# Patient Record
Sex: FEMALE | Race: OTHER | HISPANIC OR LATINO
[De-identification: names, ages, dates, MRNs, and addresses within clinical notes are randomized per-mention and may not be internally consistent; named-entity substitution may affect disease eponyms.]

---

## 2018-01-13 ENCOUNTER — RESULT REVIEW (OUTPATIENT)
Age: 28
End: 2018-01-13

## 2018-08-17 ENCOUNTER — EMERGENCY (EMERGENCY)
Facility: HOSPITAL | Age: 28
LOS: 1 days | Discharge: STILL ADMITTED OR ADMINISTRATIVE | End: 2018-08-17
Admitting: EMERGENCY MEDICINE

## 2018-08-17 ENCOUNTER — INPATIENT (INPATIENT)
Facility: HOSPITAL | Age: 28
LOS: 1 days | Discharge: HOME | End: 2018-08-19
Attending: OBSTETRICS & GYNECOLOGY | Admitting: OBSTETRICS & GYNECOLOGY

## 2018-08-17 ENCOUNTER — TRANSCRIPTION ENCOUNTER (OUTPATIENT)
Age: 28
End: 2018-08-17

## 2018-08-17 VITALS
DIASTOLIC BLOOD PRESSURE: 83 MMHG | SYSTOLIC BLOOD PRESSURE: 108 MMHG | TEMPERATURE: 99 F | RESPIRATION RATE: 16 BRPM | OXYGEN SATURATION: 100 % | HEART RATE: 66 BPM

## 2018-08-17 VITALS — WEIGHT: 145.51 LBS | HEIGHT: 60 IN

## 2018-08-17 DIAGNOSIS — O26.899 OTHER SPECIFIED PREGNANCY RELATED CONDITIONS, UNSPECIFIED TRIMESTER: ICD-10-CM

## 2018-08-17 DIAGNOSIS — Z3A.00 WEEKS OF GESTATION OF PREGNANCY NOT SPECIFIED: ICD-10-CM

## 2018-08-17 LAB
BASOPHILS # BLD AUTO: 0.03 K/UL — SIGNIFICANT CHANGE UP (ref 0–0.2)
BASOPHILS NFR BLD AUTO: 0.4 % — SIGNIFICANT CHANGE UP (ref 0–2)
BLD GP AB SCN SERPL QL: NEGATIVE — SIGNIFICANT CHANGE UP
EOSINOPHIL # BLD AUTO: 0.15 K/UL — SIGNIFICANT CHANGE UP (ref 0–0.5)
EOSINOPHIL NFR BLD AUTO: 2 % — SIGNIFICANT CHANGE UP (ref 0–6)
HCT VFR BLD CALC: 34.4 % — LOW (ref 34.5–45)
HGB BLD-MCNC: 11.2 G/DL — LOW (ref 11.5–15.5)
IMM GRANULOCYTES # BLD AUTO: 0.05 # — SIGNIFICANT CHANGE UP
IMM GRANULOCYTES NFR BLD AUTO: 0.7 % — SIGNIFICANT CHANGE UP (ref 0–1.5)
LYMPHOCYTES # BLD AUTO: 1.83 K/UL — SIGNIFICANT CHANGE UP (ref 1–3.3)
LYMPHOCYTES # BLD AUTO: 24.5 % — SIGNIFICANT CHANGE UP (ref 13–44)
MCHC RBC-ENTMCNC: 28 PG — SIGNIFICANT CHANGE UP (ref 27–34)
MCHC RBC-ENTMCNC: 32.6 % — SIGNIFICANT CHANGE UP (ref 32–36)
MCV RBC AUTO: 86 FL — SIGNIFICANT CHANGE UP (ref 80–100)
MONOCYTES # BLD AUTO: 0.54 K/UL — SIGNIFICANT CHANGE UP (ref 0–0.9)
MONOCYTES NFR BLD AUTO: 7.2 % — SIGNIFICANT CHANGE UP (ref 2–14)
NEUTROPHILS # BLD AUTO: 4.86 K/UL — SIGNIFICANT CHANGE UP (ref 1.8–7.4)
NEUTROPHILS NFR BLD AUTO: 65.2 % — SIGNIFICANT CHANGE UP (ref 43–77)
NRBC # FLD: 0 — SIGNIFICANT CHANGE UP
PLATELET # BLD AUTO: 242 K/UL — SIGNIFICANT CHANGE UP (ref 150–400)
PMV BLD: 10.4 FL — SIGNIFICANT CHANGE UP (ref 7–13)
RBC # BLD: 4 M/UL — SIGNIFICANT CHANGE UP (ref 3.8–5.2)
RBC # FLD: 14 % — SIGNIFICANT CHANGE UP (ref 10.3–14.5)
RH IG SCN BLD-IMP: POSITIVE — SIGNIFICANT CHANGE UP
RH IG SCN BLD-IMP: POSITIVE — SIGNIFICANT CHANGE UP
T PALLIDUM AB TITR SER: NEGATIVE — SIGNIFICANT CHANGE UP
WBC # BLD: 7.46 K/UL — SIGNIFICANT CHANGE UP (ref 3.8–10.5)
WBC # FLD AUTO: 7.46 K/UL — SIGNIFICANT CHANGE UP (ref 3.8–10.5)

## 2018-08-17 RX ORDER — MAGNESIUM HYDROXIDE 400 MG/1
30 TABLET, CHEWABLE ORAL
Qty: 0 | Refills: 0 | Status: DISCONTINUED | OUTPATIENT
Start: 2018-08-17 | End: 2018-08-19

## 2018-08-17 RX ORDER — OXYCODONE HYDROCHLORIDE 5 MG/1
5 TABLET ORAL
Qty: 0 | Refills: 0 | Status: DISCONTINUED | OUTPATIENT
Start: 2018-08-17 | End: 2018-08-19

## 2018-08-17 RX ORDER — PRAMOXINE HYDROCHLORIDE 150 MG/15G
1 AEROSOL, FOAM RECTAL EVERY 4 HOURS
Qty: 0 | Refills: 0 | Status: DISCONTINUED | OUTPATIENT
Start: 2018-08-17 | End: 2018-08-17

## 2018-08-17 RX ORDER — SODIUM CHLORIDE 9 MG/ML
3 INJECTION INTRAMUSCULAR; INTRAVENOUS; SUBCUTANEOUS EVERY 8 HOURS
Qty: 0 | Refills: 0 | Status: DISCONTINUED | OUTPATIENT
Start: 2018-08-17 | End: 2018-08-17

## 2018-08-17 RX ORDER — IBUPROFEN 200 MG
600 TABLET ORAL EVERY 6 HOURS
Qty: 0 | Refills: 0 | Status: DISCONTINUED | OUTPATIENT
Start: 2018-08-17 | End: 2018-08-19

## 2018-08-17 RX ORDER — SODIUM CHLORIDE 9 MG/ML
3 INJECTION INTRAMUSCULAR; INTRAVENOUS; SUBCUTANEOUS EVERY 8 HOURS
Qty: 0 | Refills: 0 | Status: DISCONTINUED | OUTPATIENT
Start: 2018-08-17 | End: 2018-08-19

## 2018-08-17 RX ORDER — OXYTOCIN 10 UNIT/ML
41.67 VIAL (ML) INJECTION
Qty: 20 | Refills: 0 | Status: DISCONTINUED | OUTPATIENT
Start: 2018-08-17 | End: 2018-08-17

## 2018-08-17 RX ORDER — GLYCERIN ADULT
1 SUPPOSITORY, RECTAL RECTAL AT BEDTIME
Qty: 0 | Refills: 0 | Status: DISCONTINUED | OUTPATIENT
Start: 2018-08-17 | End: 2018-08-19

## 2018-08-17 RX ORDER — HYDROCORTISONE 1 %
1 OINTMENT (GRAM) TOPICAL EVERY 4 HOURS
Qty: 0 | Refills: 0 | Status: DISCONTINUED | OUTPATIENT
Start: 2018-08-17 | End: 2018-08-18

## 2018-08-17 RX ORDER — OXYTOCIN 10 UNIT/ML
333.33 VIAL (ML) INJECTION
Qty: 20 | Refills: 0 | Status: COMPLETED | OUTPATIENT
Start: 2018-08-17

## 2018-08-17 RX ORDER — KETOROLAC TROMETHAMINE 30 MG/ML
30 SYRINGE (ML) INJECTION ONCE
Qty: 0 | Refills: 0 | Status: DISCONTINUED | OUTPATIENT
Start: 2018-08-17 | End: 2018-08-17

## 2018-08-17 RX ORDER — PRAMOXINE HYDROCHLORIDE 150 MG/15G
1 AEROSOL, FOAM RECTAL EVERY 4 HOURS
Qty: 0 | Refills: 0 | Status: DISCONTINUED | OUTPATIENT
Start: 2018-08-17 | End: 2018-08-18

## 2018-08-17 RX ORDER — SODIUM CHLORIDE 9 MG/ML
1000 INJECTION, SOLUTION INTRAVENOUS
Qty: 0 | Refills: 0 | Status: DISCONTINUED | OUTPATIENT
Start: 2018-08-17 | End: 2018-08-17

## 2018-08-17 RX ORDER — DOCUSATE SODIUM 100 MG
100 CAPSULE ORAL
Qty: 0 | Refills: 0 | Status: DISCONTINUED | OUTPATIENT
Start: 2018-08-17 | End: 2018-08-19

## 2018-08-17 RX ORDER — OXYTOCIN 10 UNIT/ML
333.33 VIAL (ML) INJECTION
Qty: 20 | Refills: 0 | Status: DISCONTINUED | OUTPATIENT
Start: 2018-08-17 | End: 2018-08-18

## 2018-08-17 RX ORDER — ACETAMINOPHEN 500 MG
975 TABLET ORAL EVERY 6 HOURS
Qty: 0 | Refills: 0 | Status: DISCONTINUED | OUTPATIENT
Start: 2018-08-17 | End: 2018-08-19

## 2018-08-17 RX ORDER — HYDROCORTISONE 1 %
1 OINTMENT (GRAM) TOPICAL EVERY 4 HOURS
Qty: 0 | Refills: 0 | Status: DISCONTINUED | OUTPATIENT
Start: 2018-08-17 | End: 2018-08-17

## 2018-08-17 RX ORDER — OXYCODONE HYDROCHLORIDE 5 MG/1
5 TABLET ORAL EVERY 4 HOURS
Qty: 0 | Refills: 0 | Status: DISCONTINUED | OUTPATIENT
Start: 2018-08-17 | End: 2018-08-19

## 2018-08-17 RX ORDER — DIBUCAINE 1 %
1 OINTMENT (GRAM) RECTAL EVERY 4 HOURS
Qty: 0 | Refills: 0 | Status: DISCONTINUED | OUTPATIENT
Start: 2018-08-17 | End: 2018-08-17

## 2018-08-17 RX ORDER — DIPHENHYDRAMINE HCL 50 MG
25 CAPSULE ORAL EVERY 6 HOURS
Qty: 0 | Refills: 0 | Status: DISCONTINUED | OUTPATIENT
Start: 2018-08-17 | End: 2018-08-19

## 2018-08-17 RX ORDER — SODIUM CHLORIDE 9 MG/ML
1000 INJECTION, SOLUTION INTRAVENOUS ONCE
Qty: 0 | Refills: 0 | Status: COMPLETED | OUTPATIENT
Start: 2018-08-17 | End: 2018-08-17

## 2018-08-17 RX ORDER — AER TRAVELER 0.5 G/1
1 SOLUTION RECTAL; TOPICAL EVERY 4 HOURS
Qty: 0 | Refills: 0 | Status: DISCONTINUED | OUTPATIENT
Start: 2018-08-17 | End: 2018-08-17

## 2018-08-17 RX ORDER — OXYTOCIN 10 UNIT/ML
2 VIAL (ML) INJECTION
Qty: 30 | Refills: 0 | Status: DISCONTINUED | OUTPATIENT
Start: 2018-08-17 | End: 2018-08-18

## 2018-08-17 RX ORDER — AER TRAVELER 0.5 G/1
1 SOLUTION RECTAL; TOPICAL EVERY 4 HOURS
Qty: 0 | Refills: 0 | Status: DISCONTINUED | OUTPATIENT
Start: 2018-08-17 | End: 2018-08-19

## 2018-08-17 RX ORDER — LANOLIN
1 OINTMENT (GRAM) TOPICAL EVERY 6 HOURS
Qty: 0 | Refills: 0 | Status: DISCONTINUED | OUTPATIENT
Start: 2018-08-17 | End: 2018-08-19

## 2018-08-17 RX ORDER — ACETAMINOPHEN 500 MG
975 TABLET ORAL EVERY 6 HOURS
Qty: 0 | Refills: 0 | Status: COMPLETED | OUTPATIENT
Start: 2018-08-17 | End: 2019-07-16

## 2018-08-17 RX ORDER — SIMETHICONE 80 MG/1
80 TABLET, CHEWABLE ORAL EVERY 6 HOURS
Qty: 0 | Refills: 0 | Status: DISCONTINUED | OUTPATIENT
Start: 2018-08-17 | End: 2018-08-19

## 2018-08-17 RX ORDER — IBUPROFEN 200 MG
600 TABLET ORAL EVERY 6 HOURS
Qty: 0 | Refills: 0 | Status: COMPLETED | OUTPATIENT
Start: 2018-08-17 | End: 2019-07-16

## 2018-08-17 RX ORDER — DIBUCAINE 1 %
1 OINTMENT (GRAM) RECTAL EVERY 4 HOURS
Qty: 0 | Refills: 0 | Status: DISCONTINUED | OUTPATIENT
Start: 2018-08-17 | End: 2018-08-19

## 2018-08-17 RX ORDER — TETANUS TOXOID, REDUCED DIPHTHERIA TOXOID AND ACELLULAR PERTUSSIS VACCINE, ADSORBED 5; 2.5; 8; 8; 2.5 [IU]/.5ML; [IU]/.5ML; UG/.5ML; UG/.5ML; UG/.5ML
0.5 SUSPENSION INTRAMUSCULAR ONCE
Qty: 0 | Refills: 0 | Status: COMPLETED | OUTPATIENT
Start: 2018-08-17

## 2018-08-17 RX ADMIN — Medication 2 MILLIUNIT(S)/MIN: at 10:42

## 2018-08-17 RX ADMIN — Medication 30 MILLIGRAM(S): at 16:40

## 2018-08-17 RX ADMIN — SODIUM CHLORIDE 250 MILLILITER(S): 9 INJECTION, SOLUTION INTRAVENOUS at 10:29

## 2018-08-17 RX ADMIN — Medication 975 MILLIGRAM(S): at 23:48

## 2018-08-17 RX ADMIN — SODIUM CHLORIDE 3 MILLILITER(S): 9 INJECTION INTRAMUSCULAR; INTRAVENOUS; SUBCUTANEOUS at 22:10

## 2018-08-17 RX ADMIN — Medication 975 MILLIGRAM(S): at 20:15

## 2018-08-17 RX ADMIN — Medication 1000 MILLIUNIT(S)/MIN: at 16:30

## 2018-08-17 RX ADMIN — Medication 30 MILLIGRAM(S): at 16:45

## 2018-08-17 RX ADMIN — SODIUM CHLORIDE 2000 MILLILITER(S): 9 INJECTION, SOLUTION INTRAVENOUS at 12:45

## 2018-08-17 RX ADMIN — Medication 125 MILLIUNIT(S)/MIN: at 16:36

## 2018-08-17 RX ADMIN — Medication 600 MILLIGRAM(S): at 23:48

## 2018-08-17 RX ADMIN — Medication 975 MILLIGRAM(S): at 19:45

## 2018-08-17 NOTE — DISCHARGE NOTE OB - MATERIALS PROVIDED
Tdap Vaccination (VIS Pub Date: 2012)/Breastfeeding Log/Shaken Baby Prevention Handout/Breastfeeding Guide and Packet/Birth Certificate Instructions/[facility] [place] Screening Program/[place]  Immunization Record/Breastfeeding Mother’s Support Group Information/Guide to Postpartum Care/[facility] Hearing Screen Program/Back To Sleep Handout/Vaccinations

## 2018-08-17 NOTE — DISCHARGE NOTE OB - CARE PROVIDER_API CALL
Nancy Sierra), Obstetrics and Gynecology  [address]  [address]  [place] [address]  Phone: [phone]  Fax: [phone]

## 2018-08-17 NOTE — DISCHARGE NOTE OB - PATIENT PORTAL LINK FT
You can access the [email or web address] Patient Portal, offered by [facility], by registering with the following website: [email or web address]

## 2018-08-17 NOTE — DISCHARGE NOTE OB - HOSPITAL COURSE
29 yo female s/p  on  for viable male infant.  Post partum course uncomplicated.  Patient is meeting all milestones for discharge.  Stable for discharge.  Follow up as scheduled in the office.

## 2018-08-17 NOTE — ED ADULT TRIAGE NOTE - CHIEF COMPLAINT QUOTE
pt arrives w/ c/o water breaking 1 hr ago. Pt states 40 weeks pregnant and states . pt just bloody discharge. Spoke with Jackie in L&D pt okayed to come to L&D. MD Kitchen called for eval pt cleared to be transported to L&D with ed tech,

## 2018-08-18 RX ORDER — HYDROCORTISONE 1 %
1 OINTMENT (GRAM) TOPICAL EVERY 4 HOURS
Qty: 0 | Refills: 0 | Status: DISCONTINUED | OUTPATIENT
Start: 2018-08-18 | End: 2018-08-19

## 2018-08-18 RX ORDER — TETANUS TOXOID, REDUCED DIPHTHERIA TOXOID AND ACELLULAR PERTUSSIS VACCINE, ADSORBED 5; 2.5; 8; 8; 2.5 [IU]/.5ML; [IU]/.5ML; UG/.5ML; UG/.5ML; UG/.5ML
0.5 SUSPENSION INTRAMUSCULAR ONCE
Qty: 0 | Refills: 0 | Status: COMPLETED | OUTPATIENT
Start: 2018-08-18 | End: 2018-08-18

## 2018-08-18 RX ORDER — PRAMOXINE HYDROCHLORIDE 150 MG/15G
1 AEROSOL, FOAM RECTAL EVERY 4 HOURS
Qty: 0 | Refills: 0 | Status: DISCONTINUED | OUTPATIENT
Start: 2018-08-18 | End: 2018-08-19

## 2018-08-18 RX ADMIN — SODIUM CHLORIDE 3 MILLILITER(S): 9 INJECTION INTRAMUSCULAR; INTRAVENOUS; SUBCUTANEOUS at 13:46

## 2018-08-18 RX ADMIN — Medication 600 MILLIGRAM(S): at 13:55

## 2018-08-18 RX ADMIN — Medication 600 MILLIGRAM(S): at 17:47

## 2018-08-18 RX ADMIN — Medication 600 MILLIGRAM(S): at 05:41

## 2018-08-18 RX ADMIN — Medication 1 TABLET(S): at 12:56

## 2018-08-18 RX ADMIN — Medication 975 MILLIGRAM(S): at 06:17

## 2018-08-18 RX ADMIN — Medication 975 MILLIGRAM(S): at 17:47

## 2018-08-18 RX ADMIN — OXYCODONE HYDROCHLORIDE 5 MILLIGRAM(S): 5 TABLET ORAL at 09:40

## 2018-08-18 RX ADMIN — Medication 600 MILLIGRAM(S): at 12:56

## 2018-08-18 RX ADMIN — Medication 600 MILLIGRAM(S): at 06:17

## 2018-08-18 RX ADMIN — Medication 600 MILLIGRAM(S): at 18:45

## 2018-08-18 RX ADMIN — Medication 975 MILLIGRAM(S): at 13:55

## 2018-08-18 RX ADMIN — Medication 975 MILLIGRAM(S): at 18:45

## 2018-08-18 RX ADMIN — Medication 975 MILLIGRAM(S): at 05:41

## 2018-08-18 RX ADMIN — Medication 975 MILLIGRAM(S): at 00:36

## 2018-08-18 RX ADMIN — OXYCODONE HYDROCHLORIDE 5 MILLIGRAM(S): 5 TABLET ORAL at 10:40

## 2018-08-18 RX ADMIN — TETANUS TOXOID, REDUCED DIPHTHERIA TOXOID AND ACELLULAR PERTUSSIS VACCINE, ADSORBED 0.5 MILLILITER(S): 5; 2.5; 8; 8; 2.5 SUSPENSION INTRAMUSCULAR at 18:42

## 2018-08-18 RX ADMIN — Medication 975 MILLIGRAM(S): at 12:56

## 2018-08-18 RX ADMIN — Medication 600 MILLIGRAM(S): at 00:36

## 2018-08-18 RX ADMIN — SODIUM CHLORIDE 3 MILLILITER(S): 9 INJECTION INTRAMUSCULAR; INTRAVENOUS; SUBCUTANEOUS at 05:41

## 2018-08-19 VITALS
TEMPERATURE: 98 F | HEART RATE: 58 BPM | OXYGEN SATURATION: 99 % | SYSTOLIC BLOOD PRESSURE: 107 MMHG | RESPIRATION RATE: 17 BRPM | DIASTOLIC BLOOD PRESSURE: 67 MMHG

## 2018-08-19 RX ORDER — IBUPROFEN 200 MG
1 TABLET ORAL
Qty: 0 | Refills: 0 | COMMUNITY
Start: 2018-08-19

## 2018-08-19 RX ORDER — ACETAMINOPHEN 500 MG
3 TABLET ORAL
Qty: 0 | Refills: 0 | COMMUNITY
Start: 2018-08-19

## 2018-08-19 RX ADMIN — Medication 975 MILLIGRAM(S): at 06:25

## 2018-08-19 RX ADMIN — Medication 600 MILLIGRAM(S): at 00:40

## 2018-08-19 RX ADMIN — Medication 600 MILLIGRAM(S): at 07:00

## 2018-08-19 RX ADMIN — Medication 975 MILLIGRAM(S): at 07:00

## 2018-08-19 RX ADMIN — Medication 600 MILLIGRAM(S): at 00:16

## 2018-08-19 RX ADMIN — Medication 975 MILLIGRAM(S): at 00:40

## 2018-08-19 RX ADMIN — Medication 600 MILLIGRAM(S): at 06:25

## 2018-08-19 RX ADMIN — Medication 975 MILLIGRAM(S): at 00:16

## 2018-08-19 NOTE — PROGRESS NOTE ADULT - SUBJECTIVE AND OBJECTIVE BOX
S: Patient doing well. Minimal lochia. Pain controlled.     O: Vital Signs Last 24 Hrs  T(C): 36.2 (18 Aug 2018 17:33), Max: 36.5 (18 Aug 2018 06:34)  T(F): 97.2 (18 Aug 2018 17:33), Max: 97.7 (18 Aug 2018 06:34)  HR: 76 (18 Aug 2018 17:33) (58 - 76)  BP: 101/62 (18 Aug 2018 17:33) (95/56 - 101/62)  BP(mean): --  RR: 18 (18 Aug 2018 17:33) (18 - 18)  SpO2: 99% (18 Aug 2018 17:33) (95% - 99%)    Gen: NAD  Abd: soft, NT, ND, fundus firm below umbilicus  Lochia: moderate  Ext: no tenderness    Labs:                        11.2   7.46  )-----------( 242      ( 17 Aug 2018 06:45 )             34.4       ABO Interpretation: O ( @ 06:53)    Rh Interpretation: Positive ( @ 06:53)    Antibody Screen Negative      A: 28y PPD#2 s/p  doing well.     Plan: Continue routine postpartum care. Anticipate d/c home.  Marycarmen LUCIO

## 2022-06-06 ENCOUNTER — EMERGENCY (EMERGENCY)
Facility: HOSPITAL | Age: 32
LOS: 1 days | Discharge: HOME | End: 2022-06-06
Attending: EMERGENCY MEDICINE | Admitting: EMERGENCY MEDICINE
Payer: MEDICAID

## 2022-06-06 VITALS
OXYGEN SATURATION: 100 % | HEIGHT: 60 IN | SYSTOLIC BLOOD PRESSURE: 115 MMHG | HEART RATE: 86 BPM | DIASTOLIC BLOOD PRESSURE: 72 MMHG | RESPIRATION RATE: 18 BRPM | TEMPERATURE: 97 F

## 2022-06-06 PROCEDURE — 99284 EMERGENCY DEPT VISIT MOD MDM: CPT

## 2022-06-07 VITALS
TEMPERATURE: 98 F | HEART RATE: 72 BPM | DIASTOLIC BLOOD PRESSURE: 75 MMHG | RESPIRATION RATE: 18 BRPM | OXYGEN SATURATION: 100 % | SYSTOLIC BLOOD PRESSURE: 109 MMHG

## 2022-06-07 PROCEDURE — 70360 X-RAY EXAM OF NECK: CPT | Mod: 26

## 2022-06-07 PROCEDURE — 70490 CT SOFT TISSUE NECK W/O DYE: CPT | Mod: 26,MA

## 2022-06-07 RX ORDER — KETOROLAC TROMETHAMINE 30 MG/ML
30 SYRINGE (ML) INJECTION ONCE
Refills: 0 | Status: DISCONTINUED | OUTPATIENT
Start: 2022-06-07 | End: 2022-06-07

## 2022-06-07 RX ORDER — DEXAMETHASONE 0.5 MG/5ML
6 ELIXIR ORAL ONCE
Refills: 0 | Status: COMPLETED | OUTPATIENT
Start: 2022-06-07 | End: 2022-06-07

## 2022-06-07 RX ADMIN — Medication 30 MILLIGRAM(S): at 02:24

## 2022-06-07 RX ADMIN — Medication 6 MILLIGRAM(S): at 05:19

## 2022-06-07 NOTE — ED PROVIDER NOTE - PROGRESS NOTE DETAILS
Aurelio Loyola, PGY3: CT without foreign body noted. Will PO challenge and reassess. Patient has multiple incidental findings that can be followed up outpatient. If able to PO, can d/c w/ ENT follow up. Patient aware of plan, discussed using  Valerie #[id number]. Aurelio Loyola, PGY3: No acute obstruction on x-ray. Will obtain CT to further eval. Aurelio Loyola, PGY3: Patient tolerating PO. Will give ENT follow up. Discussed return precautions and all questions answered. Pt in agreement w/ plan. CAOx3, NAD, VSS. Stable for d/c.

## 2022-06-07 NOTE — ED PROVIDER NOTE - NSFOLLOWUPCLINICS_GEN_ALL_ED_FT
[facility] - ENT  Otolaryngology (ENT)  [address] [place] [address]  Phone: [phone]  Fax:   Follow Up Time: Routine

## 2022-06-07 NOTE — ED ADULT NURSE NOTE - CHIEF COMPLAINT QUOTE
Patient arrives with fish bone stuck in throat 1900. Endorsing discomfort to throat. NAD noted, speaking in full sentences, respirations even and unlabored. No PMHx.

## 2022-06-07 NOTE — ED ADULT NURSE REASSESSMENT NOTE - NS ED NURSE REASSESS COMMENT FT1
Pt resting comfortably in stretcher. Respirations even and unlabored O2 sat 100%. MD Loyola at bedside, will continue to monitor.
Break RN: Pt is aox4, c/o throat pain at this time. Taken to XR AND CT, pending results. Medications given as ordered. No facial drooling noted, breathing non-labored. Will continue to monitor

## 2022-06-07 NOTE — ED PROVIDER NOTE - CLINICAL SUMMARY MEDICAL DECISION MAKING FREE TEXT BOX
Aurelio Loyoal, PGY3: 32 year old female p/w foreign body sensation in throat after eating fish, likely fishbone foreign body in throat. Tolerating secretions, airway patent. Will obtain XR neck, consider CT if unable to locate, ENT consult, reassess for dispo.

## 2022-06-07 NOTE — ED PROVIDER NOTE - OBJECTIVE STATEMENT
32 year old female no PMH presents to ED for foreign body. Patient states she was eating fish she prepared around 7pm when she developed R sided neck pain and foreign body sensation. She felt pain while swallowing and speaking and later developed vomiting. She notes multiple episodes of vomiting and now c/o frontal headache. She denies fever, chills, chest pain, or sob. 32 year old female no PMH presents to ED for foreign body. Patient states she was eating fish she prepared around 7pm when she developed R sided neck pain and foreign body sensation. She felt pain while swallowing and speaking and later developed vomiting. She notes multiple episodes of vomiting and now c/o frontal headache. She denies fever, chills, chest pain, or sob.     Roxana #[id number]

## 2022-06-07 NOTE — ED ADULT NURSE NOTE - OBJECTIVE STATEMENT
Pt received in rm #12, 32Y F Aox4, ambulatory primary Lebanese speaking. Pt c/o possible foreign body in throat. Reports ate fish around 7PM last night, Now endorsing painful swallowing, and right sided neck pain. Denies sob, difficulty swallowing, drooling. Pt respirations even and unlabored, Taken to XR at this time, will continue to monitor upon return

## 2022-06-07 NOTE — ED PROVIDER NOTE - PATIENT PORTAL LINK FT
You can access the FollowMyHealth Patient Portal offered by [facility] by registering at the following website: [email or web address]. By joining [employer]’s FollowMyHealth portal, you will also be able to view your health information using other applications (apps) compatible with our system.

## 2022-06-07 NOTE — ED PROVIDER NOTE - PHYSICAL EXAMINATION
GENERAL: A&Ox3, non-toxic appearing, no acute distress  HEENT: NCAT, EOMI, oral mucosa moist, normal conjunctiva, no lymphadenopathy, no pharyngeal erythema, no tonsillar exudates, no visible foreign body, tolerating secretions, no drooling, no hoarseness   RESP: CTAB, no respiratory distress, no wheezes/rhonchi/rales, speaking in full sentences  CV: RRR, no murmurs/rubs/gallops  ABDOMEN: soft, non-tender, non-distended, no guarding  MSK: no visible deformities  NEURO: no focal sensory or motor deficits, CN 2-12 grossly intact  SKIN: warm, normal color, well perfused, no rash  PSYCH: normal affect

## 2022-06-07 NOTE — ED ADULT NURSE NOTE - NSIMPLEMENTINTERV_GEN_ALL_ED
Implemented All Universal Safety Interventions:  [place] to call system. Call bell, personal items and telephone within reach. Instruct patient to call for assistance. Room bathroom lighting operational. Non-slip footwear when patient is off stretcher. Physically safe environment: no spills, clutter or unnecessary equipment. Stretcher in lowest position, wheels locked, appropriate side rails in place.

## 2022-06-07 NOTE — ED PROVIDER NOTE - ATTENDING CONTRIBUTION TO CARE
Afebrile. Awake and Alert. Lungs CTA. Heart RRR. CN II-XII grossly intact. Moves all extremities without lateralization. Mouth: Oropharynx clear, uvula midline, no tonsilar hypertrophy, exudate or erythema, no anterior cervical lymphadenopathy. No drooling. No hoarseness.    r/o FB in throat

## 2022-06-07 NOTE — ED PROVIDER NOTE - NSFOLLOWUPINSTRUCTIONS_ED_ALL_ED_FT
You were seen in the emergency department for possible foreign body in your throat. You had an x-ray and CT scan which did not locate any blockage. If you continue to have symptoms, please follow up with the ENT doctors as above.     Advance your diet slowly as tolerated. This may include only liquid diet or soft foods until symptoms improve.     Rest, drink plenty of fluids.  Advance activity as tolerated.  Continue all previously prescribed medications as directed.  Follow up with your primary care physician in 48-72 hours- bring copies of your results.  Return to the ER for worsening or persistent symptoms, and/or ANY NEW OR CONCERNING SYMPTOMS. If you have issues obtaining follow up, please call: [phone] to obtain a doctor or specialist who takes your insurance in your area.    ______________________________________________________________________________________  Fue atendido en urgencias por posible cuerpo extraño en la garganta. Le hicieron kate radiografía y kate tomografía computarizada que no localizaron ningún bloqueo. Si continúa teniendo síntomas, lulu un seguimiento con los médicos otorrinolaringólogos zaheer se indicó anteriormente.    Avance en salas dieta lentamente según lo tolere. [place] puede incluir solo kate dieta líquida o alimentos blandos hasta que los síntomas mejoren.    Descansa, anastasia muchos líquidos. Avance en la actividad según lo tolere. Continúe con todos los medicamentos recetados previamente según las indicaciones. Lulu un seguimiento con salas médico de atención primaria en 48 a 72 horas; traiga copias de joo resultados. Regrese a la fredo de emergencias si los síntomas empeoran o persisten, y/o CUALQUIER SÍNTOMA NUEVO O PREOCUPANTE. Si tiene problemas para obtener un seguimiento, rich ramirez: [phone] para obtener un médico o especialista que acepte salas seguro en salas área.

## 2023-01-09 ENCOUNTER — EMERGENCY (EMERGENCY)
Facility: HOSPITAL | Age: 33
LOS: 1 days | Discharge: HOME | End: 2023-01-09
Admitting: EMERGENCY MEDICINE
Payer: MEDICAID

## 2023-01-09 VITALS
TEMPERATURE: 98 F | RESPIRATION RATE: 18 BRPM | HEART RATE: 65 BPM | DIASTOLIC BLOOD PRESSURE: 61 MMHG | SYSTOLIC BLOOD PRESSURE: 103 MMHG | OXYGEN SATURATION: 100 %

## 2023-01-09 PROCEDURE — 99284 EMERGENCY DEPT VISIT MOD MDM: CPT

## 2023-01-09 RX ORDER — SODIUM CHLORIDE 9 MG/ML
1000 INJECTION INTRAMUSCULAR; INTRAVENOUS; SUBCUTANEOUS ONCE
Refills: 0 | Status: COMPLETED | OUTPATIENT
Start: 2023-01-09 | End: 2023-01-09

## 2023-01-09 RX ORDER — ONDANSETRON 8 MG/1
4 TABLET, FILM COATED ORAL ONCE
Refills: 0 | Status: COMPLETED | OUTPATIENT
Start: 2023-01-09 | End: 2023-01-09

## 2023-01-09 RX ORDER — KETOROLAC TROMETHAMINE 30 MG/ML
15 SYRINGE (ML) INJECTION ONCE
Refills: 0 | Status: DISCONTINUED | OUTPATIENT
Start: 2023-01-09 | End: 2023-01-09

## 2023-01-10 VITALS
HEART RATE: 72 BPM | SYSTOLIC BLOOD PRESSURE: 92 MMHG | RESPIRATION RATE: 18 BRPM | TEMPERATURE: 98 F | DIASTOLIC BLOOD PRESSURE: 59 MMHG | OXYGEN SATURATION: 100 %

## 2023-01-10 PROBLEM — Z78.9 OTHER SPECIFIED HEALTH STATUS: Chronic | Status: ACTIVE | Noted: 2022-06-07

## 2023-01-10 LAB
ALBUMIN SERPL ELPH-MCNC: 3.8 G/DL — SIGNIFICANT CHANGE UP (ref 3.3–5)
ALP SERPL-CCNC: 85 U/L — SIGNIFICANT CHANGE UP (ref 40–120)
ALT FLD-CCNC: 18 U/L — SIGNIFICANT CHANGE UP (ref 4–33)
ANION GAP SERPL CALC-SCNC: 10 MMOL/L — SIGNIFICANT CHANGE UP (ref 7–14)
APPEARANCE UR: CLEAR — SIGNIFICANT CHANGE UP
AST SERPL-CCNC: 18 U/L — SIGNIFICANT CHANGE UP (ref 4–32)
BACTERIA # UR AUTO: NEGATIVE — SIGNIFICANT CHANGE UP
BASOPHILS # BLD AUTO: 0.03 K/UL — SIGNIFICANT CHANGE UP (ref 0–0.2)
BASOPHILS NFR BLD AUTO: 0.4 % — SIGNIFICANT CHANGE UP (ref 0–2)
BILIRUB SERPL-MCNC: <0.2 MG/DL — SIGNIFICANT CHANGE UP (ref 0.2–1.2)
BILIRUB UR-MCNC: NEGATIVE — SIGNIFICANT CHANGE UP
BUN SERPL-MCNC: 13 MG/DL — SIGNIFICANT CHANGE UP (ref 7–23)
CALCIUM SERPL-MCNC: 9.3 MG/DL — SIGNIFICANT CHANGE UP (ref 8.4–10.5)
CHLORIDE SERPL-SCNC: 103 MMOL/L — SIGNIFICANT CHANGE UP (ref 98–107)
CO2 SERPL-SCNC: 23 MMOL/L — SIGNIFICANT CHANGE UP (ref 22–31)
COLOR SPEC: COLORLESS — SIGNIFICANT CHANGE UP
CREAT SERPL-MCNC: 0.6 MG/DL — SIGNIFICANT CHANGE UP (ref 0.5–1.3)
CULTURE RESULTS: SIGNIFICANT CHANGE UP
DIFF PNL FLD: ABNORMAL
EGFR: 124 ML/MIN/1.73M2 — SIGNIFICANT CHANGE UP
EOSINOPHIL # BLD AUTO: 0.21 K/UL — SIGNIFICANT CHANGE UP (ref 0–0.5)
EOSINOPHIL NFR BLD AUTO: 2.7 % — SIGNIFICANT CHANGE UP (ref 0–6)
EPI CELLS # UR: 1 /HPF — SIGNIFICANT CHANGE UP (ref 0–5)
FLUAV AG NPH QL: SIGNIFICANT CHANGE UP
FLUBV AG NPH QL: SIGNIFICANT CHANGE UP
GLUCOSE SERPL-MCNC: 79 MG/DL — SIGNIFICANT CHANGE UP (ref 70–99)
GLUCOSE UR QL: NEGATIVE — SIGNIFICANT CHANGE UP
HCT VFR BLD CALC: 34 % — LOW (ref 34.5–45)
HGB BLD-MCNC: 10.9 G/DL — LOW (ref 11.5–15.5)
IANC: 4.07 K/UL — SIGNIFICANT CHANGE UP (ref 1.8–7.4)
IMM GRANULOCYTES NFR BLD AUTO: 0.3 % — SIGNIFICANT CHANGE UP (ref 0–0.9)
KETONES UR-MCNC: NEGATIVE — SIGNIFICANT CHANGE UP
LEUKOCYTE ESTERASE UR-ACNC: NEGATIVE — SIGNIFICANT CHANGE UP
LYMPHOCYTES # BLD AUTO: 2.76 K/UL — SIGNIFICANT CHANGE UP (ref 1–3.3)
LYMPHOCYTES # BLD AUTO: 35.8 % — SIGNIFICANT CHANGE UP (ref 13–44)
MCHC RBC-ENTMCNC: 27.6 PG — SIGNIFICANT CHANGE UP (ref 27–34)
MCHC RBC-ENTMCNC: 32.1 GM/DL — SIGNIFICANT CHANGE UP (ref 32–36)
MCV RBC AUTO: 86.1 FL — SIGNIFICANT CHANGE UP (ref 80–100)
MONOCYTES # BLD AUTO: 0.63 K/UL — SIGNIFICANT CHANGE UP (ref 0–0.9)
MONOCYTES NFR BLD AUTO: 8.2 % — SIGNIFICANT CHANGE UP (ref 2–14)
NEUTROPHILS # BLD AUTO: 4.07 K/UL — SIGNIFICANT CHANGE UP (ref 1.8–7.4)
NEUTROPHILS NFR BLD AUTO: 52.6 % — SIGNIFICANT CHANGE UP (ref 43–77)
NITRITE UR-MCNC: NEGATIVE — SIGNIFICANT CHANGE UP
NRBC # BLD: 0 /100 WBCS — SIGNIFICANT CHANGE UP (ref 0–0)
NRBC # FLD: 0 K/UL — SIGNIFICANT CHANGE UP (ref 0–0)
PH UR: 7 — SIGNIFICANT CHANGE UP (ref 5–8)
PLATELET # BLD AUTO: 428 K/UL — HIGH (ref 150–400)
POTASSIUM SERPL-MCNC: 3.9 MMOL/L — SIGNIFICANT CHANGE UP (ref 3.5–5.3)
POTASSIUM SERPL-SCNC: 3.9 MMOL/L — SIGNIFICANT CHANGE UP (ref 3.5–5.3)
PROT SERPL-MCNC: 7.6 G/DL — SIGNIFICANT CHANGE UP (ref 6–8.3)
PROT UR-MCNC: ABNORMAL
RBC # BLD: 3.95 M/UL — SIGNIFICANT CHANGE UP (ref 3.8–5.2)
RBC # FLD: 12.7 % — SIGNIFICANT CHANGE UP (ref 10.3–14.5)
RBC CASTS # UR COMP ASSIST: 17 /HPF — HIGH (ref 0–4)
RSV RNA NPH QL NAA+NON-PROBE: SIGNIFICANT CHANGE UP
SARS-COV-2 RNA SPEC QL NAA+PROBE: SIGNIFICANT CHANGE UP
SODIUM SERPL-SCNC: 136 MMOL/L — SIGNIFICANT CHANGE UP (ref 135–145)
SP GR SPEC: 1.02 — SIGNIFICANT CHANGE UP (ref 1.01–1.05)
SPECIMEN SOURCE: SIGNIFICANT CHANGE UP
UROBILINOGEN FLD QL: SIGNIFICANT CHANGE UP
WBC # BLD: 7.72 K/UL — SIGNIFICANT CHANGE UP (ref 3.8–10.5)
WBC # FLD AUTO: 7.72 K/UL — SIGNIFICANT CHANGE UP (ref 3.8–10.5)
WBC UR QL: 0 /HPF — SIGNIFICANT CHANGE UP (ref 0–5)

## 2023-01-10 PROCEDURE — 76770 US EXAM ABDO BACK WALL COMP: CPT | Mod: 26

## 2023-01-10 RX ADMIN — SODIUM CHLORIDE 1000 MILLILITER(S): 9 INJECTION INTRAMUSCULAR; INTRAVENOUS; SUBCUTANEOUS at 00:19

## 2023-01-10 RX ADMIN — Medication 15 MILLIGRAM(S): at 00:19

## 2023-01-10 RX ADMIN — ONDANSETRON 4 MILLIGRAM(S): 8 TABLET, FILM COATED ORAL at 00:19

## 2023-01-10 NOTE — ED PROVIDER NOTE - OBJECTIVE STATEMENT
32-year-old female no reported past medical history presents with fevers chills trouble urinating headache dizziness x2 weeks. c/o b/l flank pain, urinary frequency, burning, and flu like symptoms. Patient denies any chest pain or shortness of breath.  No sick contacts.  Patient reporting mild back pain.  No abdominal pain nausea vomiting diarrhea.

## 2023-01-10 NOTE — ED PROVIDER NOTE - NSFOLLOWUPINSTRUCTIONS_ED_ALL_ED_FT
Follow up with your primary care physician in 48-72 hours- bring copies of your results.  Return to the ER for worsening or persistent symptoms, including but not limited to worsening/persistent pain, shortness of breath, fevers, vomiting, lightheadedness, passing out and/or ANY NEW OR CONCERNING SYMPTOMS. If you have issues obtaining follow up, please call: [phone] to obtain a doctor or specialist who takes your insurance in your area.  You may call [phone] to make an appointment with the internal medicine clinic.

## 2023-01-10 NOTE — ED PROVIDER NOTE - CLINICAL SUMMARY MEDICAL DECISION MAKING FREE TEXT BOX
32-year-old female no reported past medical history presents with fevers chills trouble urinating headache dizziness x2 weeks. c/o b/l flank pain, urinary frequency, burning, and flu like symptoms. Patient denies any chest pain or shortness of breath.  No sick contacts.  Patient reporting mild back pain.  No abdominal pain nausea vomiting diarrhea.  plan  - labs  - ua/cx  - KUB US   - ivf  - symptomatic treatment

## 2023-01-10 NOTE — ED ADULT NURSE NOTE - OBJECTIVE STATEMENT
Patient A&Ox4 primarily Upper sorbian speaking ambulatory c/o b/l flank pain, urinary frequency, burning, and flu like symptoms x2 weeks. Also endorsing intermittent nausea. Patient denies any pmhx. Respirations even and unlabored. Abdomen soft, round, slightly tender on touch. UCG negative and documented. 20G IV placed to left antecubital, labs collected and sent to lab. Medicated per orders. Stretcher in lowest position, wheels locked, appropriate side rails in place, call bell in reach.

## 2023-01-10 NOTE — ED PROVIDER NOTE - PATIENT PORTAL LINK FT
You can access the FollowMyHealth Patient Portal offered by [facility] by registering at the following website: [email or web address]. By joining [employer]’s FollowMyHealth portal, you will also be able to view your health information using other applications (apps) compatible with our system.

## 2023-08-07 ENCOUNTER — EMERGENCY (EMERGENCY)
Facility: HOSPITAL | Age: 33
LOS: 1 days | Discharge: HOME | End: 2023-08-07
Admitting: EMERGENCY MEDICINE
Payer: MEDICAID

## 2023-08-07 VITALS
TEMPERATURE: 98 F | RESPIRATION RATE: 16 BRPM | OXYGEN SATURATION: 100 % | HEART RATE: 99 BPM | DIASTOLIC BLOOD PRESSURE: 72 MMHG | SYSTOLIC BLOOD PRESSURE: 115 MMHG

## 2023-08-07 LAB
ANION GAP SERPL CALC-SCNC: 11 MMOL/L — SIGNIFICANT CHANGE UP (ref 7–14)
APPEARANCE UR: CLEAR — SIGNIFICANT CHANGE UP
B PERT DNA SPEC QL NAA+PROBE: SIGNIFICANT CHANGE UP
B PERT+PARAPERT DNA PNL SPEC NAA+PROBE: SIGNIFICANT CHANGE UP
BACTERIA # UR AUTO: NEGATIVE /HPF — SIGNIFICANT CHANGE UP
BASOPHILS # BLD AUTO: 0.03 K/UL — SIGNIFICANT CHANGE UP (ref 0–0.2)
BASOPHILS NFR BLD AUTO: 0.3 % — SIGNIFICANT CHANGE UP (ref 0–2)
BILIRUB UR-MCNC: NEGATIVE — SIGNIFICANT CHANGE UP
BORDETELLA PARAPERTUSSIS (RAPRVP): SIGNIFICANT CHANGE UP
BUN SERPL-MCNC: 13 MG/DL — SIGNIFICANT CHANGE UP (ref 7–23)
C PNEUM DNA SPEC QL NAA+PROBE: SIGNIFICANT CHANGE UP
CALCIUM SERPL-MCNC: 9 MG/DL — SIGNIFICANT CHANGE UP (ref 8.4–10.5)
CAST: 0 /LPF — SIGNIFICANT CHANGE UP (ref 0–4)
CHLORIDE SERPL-SCNC: 99 MMOL/L — SIGNIFICANT CHANGE UP (ref 98–107)
CO2 SERPL-SCNC: 23 MMOL/L — SIGNIFICANT CHANGE UP (ref 22–31)
COLOR SPEC: YELLOW — SIGNIFICANT CHANGE UP
CREAT SERPL-MCNC: 0.7 MG/DL — SIGNIFICANT CHANGE UP (ref 0.5–1.3)
DIFF PNL FLD: ABNORMAL
EGFR: 119 ML/MIN/1.73M2 — SIGNIFICANT CHANGE UP
EOSINOPHIL # BLD AUTO: 0.36 K/UL — SIGNIFICANT CHANGE UP (ref 0–0.5)
EOSINOPHIL NFR BLD AUTO: 3.7 % — SIGNIFICANT CHANGE UP (ref 0–6)
FLUAV SUBTYP SPEC NAA+PROBE: SIGNIFICANT CHANGE UP
FLUBV RNA SPEC QL NAA+PROBE: SIGNIFICANT CHANGE UP
GLUCOSE SERPL-MCNC: 90 MG/DL — SIGNIFICANT CHANGE UP (ref 70–99)
GLUCOSE UR QL: NEGATIVE MG/DL — SIGNIFICANT CHANGE UP
HADV DNA SPEC QL NAA+PROBE: SIGNIFICANT CHANGE UP
HCOV 229E RNA SPEC QL NAA+PROBE: SIGNIFICANT CHANGE UP
HCOV HKU1 RNA SPEC QL NAA+PROBE: SIGNIFICANT CHANGE UP
HCOV NL63 RNA SPEC QL NAA+PROBE: SIGNIFICANT CHANGE UP
HCOV OC43 RNA SPEC QL NAA+PROBE: SIGNIFICANT CHANGE UP
HCT VFR BLD CALC: 35.8 % — SIGNIFICANT CHANGE UP (ref 34.5–45)
HGB BLD-MCNC: 11.9 G/DL — SIGNIFICANT CHANGE UP (ref 11.5–15.5)
HMPV RNA SPEC QL NAA+PROBE: SIGNIFICANT CHANGE UP
HPIV1 RNA SPEC QL NAA+PROBE: SIGNIFICANT CHANGE UP
HPIV2 RNA SPEC QL NAA+PROBE: SIGNIFICANT CHANGE UP
HPIV3 RNA SPEC QL NAA+PROBE: SIGNIFICANT CHANGE UP
HPIV4 RNA SPEC QL NAA+PROBE: SIGNIFICANT CHANGE UP
IANC: 6.66 K/UL — SIGNIFICANT CHANGE UP (ref 1.8–7.4)
IMM GRANULOCYTES NFR BLD AUTO: 0.6 % — SIGNIFICANT CHANGE UP (ref 0–0.9)
KETONES UR-MCNC: NEGATIVE MG/DL — SIGNIFICANT CHANGE UP
LEUKOCYTE ESTERASE UR-ACNC: NEGATIVE — SIGNIFICANT CHANGE UP
LYMPHOCYTES # BLD AUTO: 1.65 K/UL — SIGNIFICANT CHANGE UP (ref 1–3.3)
LYMPHOCYTES # BLD AUTO: 17 % — SIGNIFICANT CHANGE UP (ref 13–44)
M PNEUMO DNA SPEC QL NAA+PROBE: SIGNIFICANT CHANGE UP
MCHC RBC-ENTMCNC: 28.3 PG — SIGNIFICANT CHANGE UP (ref 27–34)
MCHC RBC-ENTMCNC: 33.2 GM/DL — SIGNIFICANT CHANGE UP (ref 32–36)
MCV RBC AUTO: 85 FL — SIGNIFICANT CHANGE UP (ref 80–100)
MONOCYTES # BLD AUTO: 0.93 K/UL — HIGH (ref 0–0.9)
MONOCYTES NFR BLD AUTO: 9.6 % — SIGNIFICANT CHANGE UP (ref 2–14)
NEUTROPHILS # BLD AUTO: 6.66 K/UL — SIGNIFICANT CHANGE UP (ref 1.8–7.4)
NEUTROPHILS NFR BLD AUTO: 68.8 % — SIGNIFICANT CHANGE UP (ref 43–77)
NITRITE UR-MCNC: NEGATIVE — SIGNIFICANT CHANGE UP
NRBC # BLD: 0 /100 WBCS — SIGNIFICANT CHANGE UP (ref 0–0)
NRBC # FLD: 0 K/UL — SIGNIFICANT CHANGE UP (ref 0–0)
PH UR: 6.5 — SIGNIFICANT CHANGE UP (ref 5–8)
PLATELET # BLD AUTO: 412 K/UL — HIGH (ref 150–400)
POTASSIUM SERPL-MCNC: 4 MMOL/L — SIGNIFICANT CHANGE UP (ref 3.5–5.3)
POTASSIUM SERPL-SCNC: 4 MMOL/L — SIGNIFICANT CHANGE UP (ref 3.5–5.3)
PROT UR-MCNC: NEGATIVE MG/DL — SIGNIFICANT CHANGE UP
RAPID RVP RESULT: DETECTED
RBC # BLD: 4.21 M/UL — SIGNIFICANT CHANGE UP (ref 3.8–5.2)
RBC # FLD: 12.9 % — SIGNIFICANT CHANGE UP (ref 10.3–14.5)
RBC CASTS # UR COMP ASSIST: 26 /HPF — HIGH (ref 0–4)
RSV RNA SPEC QL NAA+PROBE: SIGNIFICANT CHANGE UP
RV+EV RNA SPEC QL NAA+PROBE: DETECTED
SARS-COV-2 RNA SPEC QL NAA+PROBE: SIGNIFICANT CHANGE UP
SODIUM SERPL-SCNC: 133 MMOL/L — LOW (ref 135–145)
SP GR SPEC: 1.02 — SIGNIFICANT CHANGE UP (ref 1–1.03)
SQUAMOUS # UR AUTO: 1 /HPF — SIGNIFICANT CHANGE UP (ref 0–5)
UROBILINOGEN FLD QL: 0.2 MG/DL — SIGNIFICANT CHANGE UP (ref 0.2–1)
WBC # BLD: 9.69 K/UL — SIGNIFICANT CHANGE UP (ref 3.8–10.5)
WBC # FLD AUTO: 9.69 K/UL — SIGNIFICANT CHANGE UP (ref 3.8–10.5)
WBC UR QL: 4 /HPF — SIGNIFICANT CHANGE UP (ref 0–5)

## 2023-08-07 PROCEDURE — 71046 X-RAY EXAM CHEST 2 VIEWS: CPT | Mod: 26

## 2023-08-07 PROCEDURE — 99284 EMERGENCY DEPT VISIT MOD MDM: CPT

## 2023-08-07 PROCEDURE — 93010 ELECTROCARDIOGRAM REPORT: CPT

## 2023-08-07 RX ORDER — KETOROLAC TROMETHAMINE 30 MG/ML
30 SYRINGE (ML) INJECTION ONCE
Refills: 0 | Status: DISCONTINUED | OUTPATIENT
Start: 2023-08-07 | End: 2023-08-07

## 2023-08-07 RX ORDER — SODIUM CHLORIDE 9 MG/ML
1000 INJECTION INTRAMUSCULAR; INTRAVENOUS; SUBCUTANEOUS ONCE
Refills: 0 | Status: COMPLETED | OUTPATIENT
Start: 2023-08-07 | End: 2023-08-07

## 2023-08-07 RX ORDER — IBUPROFEN 200 MG
1 TABLET ORAL
Qty: 20 | Refills: 0
Start: 2023-08-07 | End: 2023-08-11

## 2023-08-07 RX ADMIN — Medication 30 MILLIGRAM(S): at 22:19

## 2023-08-07 RX ADMIN — SODIUM CHLORIDE 1000 MILLILITER(S): 9 INJECTION INTRAMUSCULAR; INTRAVENOUS; SUBCUTANEOUS at 22:19

## 2023-08-07 NOTE — ED PROVIDER NOTE - OBJECTIVE STATEMENT
32 y/o female presents to ER for flu like symptoms. pt. primarily Estonian speaking -  ID [id number] - stets for the past 8-10 days has been experiencinf intermittent fevers chills bodyaches and dry cough as well as headache and generalized weakness. pt. also staetsfor the past week has been experiencing burning wth urination and urinary frequency as well. states has been taking tylenol with minimal relief. Denies cp sob nausea vomti diarrhea.   Denies recent travel or sick contacts.

## 2023-08-07 NOTE — ED ADULT TRIAGE NOTE - CHIEF COMPLAINT QUOTE
Pt c/o cough x 10 days. Endorses weakness, fever, nausea, headaches, body aches, chest pain. Denies diarrhea, SOB.  No Past Medical History.

## 2023-08-07 NOTE — ED PROVIDER NOTE - NSFOLLOWUPINSTRUCTIONS_ED_ALL_ED_FT
Viral Syndrome    WHAT YOU NEED TO KNOW:    Viral syndrome is a term used for symptoms of an infection caused by a virus. Viruses are spread easily from person to person through the air and on shared items.    DISCHARGE INSTRUCTIONS:    Call your local emergency number (911 in the [place]) or have someone else call if:    You have a seizure.    You cannot be woken.    You have chest pain or trouble breathing.  Seek care immediately if:    You have a stiff neck, a bad headache, and sensitivity to light.    You feel weak, dizzy, or confused.    You stop urinating or urinate a lot less than usual.    You cough up blood or thick yellow or green mucus.    You have severe abdominal pain or your abdomen is larger than usual.  Call your doctor if:    Your symptoms do not get better with treatment or get worse after 3 days.    You have a rash or ear pain.    You have burning when you urinate.    You have questions or concerns about your condition or care.  Medicines: You may need any of the following:    Acetaminophen decreases pain and fever. It is available without a doctor's order. Ask how much to take and how often to take it. Follow directions. Read the labels of all other medicines you are using to see if they also contain acetaminophen, or ask your doctor or pharmacist. Acetaminophen can cause liver damage if not taken correctly. Do not use more than 4 grams (4,000 milligrams) total of acetaminophen in one day.    NSAIDs, such as ibuprofen, help decrease swelling, pain, and fever. NSAIDs can cause stomach bleeding or kidney problems in certain people. If you take blood thinner medicine, always ask your healthcare provider if NSAIDs are safe for you. Always read the medicine label and follow directions.    Cold medicine helps decrease swelling, control a cough, and relieve chest or nasal congestion.    Saline nasal spray helps decrease nasal congestion.    Take your medicine as directed. Contact your healthcare provider if you think your medicine is not helping or if you have side effects. Tell him of her if you are allergic to any medicine. Keep a list of the medicines, vitamins, and herbs you take. Include the amounts, and when and why you take them. Bring the list or the pill bottles to follow-up visits. Carry your medicine list with you in case of an emergency.  Manage your symptoms:    Drink liquids as directed to prevent dehydration. Ask how much liquid to drink each day and which liquids are best for you. Ask if you should drink an oral rehydration solution (ORS). An ORS has the right amounts of water, salts, and sugar you need to replace body fluids. This may help prevent dehydration caused by vomiting or diarrhea. Do not drink liquids with caffeine. Liquids with caffeine can make dehydration worse.    Get plenty of rest to help your body heal. Take naps throughout the day. Ask your healthcare provider when you can return to work and your normal activities.    Use a cool mist humidifier to help you breathe easier. Ask your healthcare provider how to use a cool mist humidifier.    Eat honey or use cough drops for a sore throat. Cough drops are available without a doctor's order. Follow directions for taking cough drops.    Do not smoke or be close to anyone who is smoking. Nicotine and other chemicals in cigarettes and cigars can cause lung damage. Smoking can also delay healing. Ask your healthcare provider for information if you currently smoke and need help to quit. E-cigarettes or smokeless tobacco still contain nicotine. Talk to your healthcare provider before you use these products.  Prevent the spread of germs:      Wash your hands often. Wash your hands several times each day. Wash after you use the bathroom, change a child's diaper, and before you prepare or eat food. Use soap and water every time. Rub your soapy hands together, lacing your fingers. Wash the front and back of your hands, and in between your fingers. Use the fingers of one hand to scrub under the fingernails of the other hand. Wash for at least 20 seconds. Rinse with warm, running water for several seconds. Then dry your hands with a clean towel or paper towel. Use hand  that contains alcohol if soap and water are not available. Do not touch your eyes, nose, or mouth without washing your hands first.  Handwashing      Cover a sneeze or cough. Use a tissue that covers your mouth and nose. Throw the tissue away in a trash can right away. Use the bend of your arm if a tissue is not available. Wash your hands well with soap and water or use a hand .    Stay away from others while you are sick. Avoid crowds as much as possible.    Ask about vaccines you may need. Talk to your healthcare provider about your vaccine history. He or she will tell you which vaccines you need, and when to get them.  Get the influenza (flu) vaccine as soon as recommended each year. The flu vaccine is available starting in September or October. Flu viruses change, so it is important to get a flu vaccine every year.    Get the pneumonia vaccine if recommended. This vaccine is usually recommended every 5 years. Your provider will tell you when to get this vaccine, if needed.  Follow up with your doctor as directed: Write down your questions so you remember to ask them during your visits.    Síndrome viral    LO QUE NECESITA SABER:    El síndrome viral es un término usado para los síntomas de kate infección causada por un virus. Los virus son propagados fácilmente de kate persona a otra a través del aire y mediante los objetos que se comparten.    INSTRUCCIONES SOBRE EL DASHAWN HOSPITALARIA:    Llame al número local de emergencias (911 en los [place]), o pídale a alguien que llame si:    Usted sufre kate convulsión.    No es posible despertarlo.    Usted tiene dolor torácico y dificultad para respirar.  Busque atención médica de inmediato si:    Usted tiene rigidez en el aniket, dolor de madhavi intenso y sensibilidad a la enrique.    Usted se siente débil, mareado o confundido.    Usted matthew de orinar u orina menos de lo habitual.    Usted tose shannan o kate mucosidad espesa amarilla o arely.    Usted tiene dolor abdominal severo o salas abdomen está más yazan de lo habitual.  Llame a salas médico si:    Myrtle síntomas no mejoran con el tratamiento o empeoran después de 3 días.    Usted tiene salpullido o dolor de oído.    Usted siente ardor al orinar.    Usted tiene preguntas o inquietudes acerca de salas condición o cuidado.  Medicamentos:Es posible que usted necesite alguno de los siguientes:    Acetaminofénalivia el dolor y baja la fiebre. Está disponible sin receta médica. Pregunte la cantidad y la frecuencia con que debe tomarlos. Siga las indicaciones. Ovidio las etiquetas de todos los demás medicamentos que esté usando para saber si también contienen acetaminofén, o pregunte a salas médico o farmacéutico. El acetaminofén puede causar daño en el hígado cuando no se jason de forma correcta. No use más de 4 gramos (4000 miligramos) en total de acetaminofeno en un día.    Los RENZO,zaheer el ibuprofeno, ayudan a disminuir la inflamación, el dolor y la fiebre. Los RENZO pueden causar sangrado estomacal o problemas renales en ciertas personas. Si usted jason un medicamento anticoagulante, siempre pregúntele a salas médico si los RENZO son seguros para usted. Siempre ovidio la etiqueta de torin medicamento y siga las instrucciones.    El medicamento para el resfriadoayuda a disminuir la inflamación, a controlar la tos y a aliviar la congestión del pecho o nasal.    El rociador nasal de agua salinaayuda a disminuir la congestión nasal.    Elbert myrtle medicamentos zaheer se le haya indicado.Consulte con salas médico si usted leslie que salas medicamento no le está ayudando o si presenta efectos secundarios. Infórmele si es alérgico a algún medicamento. Mantenga kate lista actualizada de los medicamentos, las vitaminas y los productos herbales que jason. Incluya los siguientes datos de los medicamentos: cantidad, frecuencia y motivo de administración. Traiga con usted la lista o los envases de las píldoras a myrtle citas de seguimiento. Lleve la lista de los medicamentos con usted en jennifer de kate emergencia.  El manejo de myrtle síntomas:    Elbert líquidos zaheer se le indique para evitar kate deshidratación.Pregunte cuánto líquido debe wali cada día y cuáles líquidos son los más adecuados para usted. Pregunte si usted debería wali kate solución de rehidratación oral (SRO). Kate SRO tiene las cantidades exactas de agua, sal y azúcar que usted necesita para reemplazar los líquidos corporales. [place] podría ayudarlo a evitar la deshidratación a causa del vómito y de la diarrea. No tome líquidos con cafeína. Los líquidos con cafeína pueden empeorar la deshidratación.    Descanse lo suficiente para ayudar a que salas cuerpo sane.Elbert siestas rosalba el día. Pregunte a salas médico cuándo puede regresar a salas trabajo y a myrtle actividades cotidianas.    Use un humidificador de mary jo fría para respirar más fácilmente.Pregunte a salas médico cómo usar un humidificador de vapor frío.    Coma miel o use pastillas para la tos para el dolor de garganta.Los caramelos para la tos están disponibles sin receta médica. Siga las indicaciones para wali los caramelos para la tos.    No fume ni esté cerca a alguien que esté fumando.La nicotina y otras sustancias químicas que contienen los cigarrillos y cigarros pueden dañar los pulmones. El fumar también puede retrasar la sanación. Pida información a salas médico si usted actualmente fuma y necesita ayuda para dejar de fumar. Los cigarrillos electrónicos o el tabaco sin humo igualmente contienen nicotina. Consulte con salas médico antes de utilizar estos productos.  Prevenga la propagación de gérmenes:      Lávese las tonya frecuentemente.Lávese las tonya varias veces al día. Lávese después de usar el baño, después de cambiar pañales y antes de preparar la comida o comer. Use siempre agua y jabón. Frótese las tonya enjabonadas, entrelazando los dedos. Lávese el frente y el dorso de las tonya, y entre los dedos. Use los dedos de kate mano para restregar debajo de las uñas de la otra mano. Lávese rosalba al menos 20 segundos. Enjuague con agua corriente caliente rosalba varios segundos. Luego séquese las tonya con kate toalla limpia o kate toalla de papel. Puede usar un desinfectante para tonya que contenga alcohol, si no hay agua y jabón disponibles. No se toque los ojos, la nariz o la boca sin antes lavarse las tonya.  Lavado de tonya      Cúbrase al toser o estornudar.Use un pañuelo que cubra la boca y la nariz. Arroje el pañuelo a la basura de inmediato. Use el ángulo del brazo si no tiene un pañuelo disponible. Lávese las tonya con agua y jabón o use un desinfectante de tonya.    Manténgase alejado de los demás mientras esté enfermo.Evite las multitudes lo más que pueda.    Pregunte sobre las vacunas que pudiera necesitar.Hable con salas médico sobre salas historial de vacunación. Salas médico le indicará qué vacunas necesita y cuándo recibirlas.  Vacúnese contra la influenza (gripe) tan pronto zaheer se recomiende cada año.La vacuna antigripal se ofrece a partir de septiembre u octubre. Los virus de la gripe cambian, por lo que es importante vacunarse contra la gripe cada año.    Vacúnese contra la neumonía si se recomienda.Esta vacuna generalmente se recomienda cada 5 años. Salas médico le indicará cuándo recibir esta vacuna, de ser necesaria.  Acuda a la consulta de control con salas médico según las indicaciones:Anote myrtle preguntas para que se acuerde de hacerlas rosalba myrtle visitas.

## 2023-08-07 NOTE — ED ADULT NURSE NOTE - OBJECTIVE STATEMENT
Patient received in wellness, exam room 5. Patient's preferred language Greenlandic, ipad  (Obdulio, ID# [id number]) used to interpret during patient encounter. Patient A&Ox3 and ambulatory at baseline. Patient presents to the ED c/o fever, cough, headache, chest discomfort for approximately 10 days. Patient denies significant phx. Patient endorses cold-like symptoms for approximately 10 days, patient also endorses burning with urination. Patient denies sick contacts at home and recent travel, patient endorses fever/chills at home. Respirations even and unlabored, no signs/symptoms of acute distress; patient denies dyspnea, shortness of breath, and chest pain. 20G IV placed to left AC, labs collected and sent, medications administered per eMAR, patient is stable at this time. Steady gait observed.

## 2023-08-07 NOTE — ED PROVIDER NOTE - CLINICAL SUMMARY MEDICAL DECISION MAKING FREE TEXT BOX
34 y/o female c/o flu like symptoms and urinary frequency/dysuria  -likely viral syndrome, r/o uti  -labs ua ucx rvp  -iv fluids nsaids cxr  -reassess

## 2023-08-09 LAB
CULTURE RESULTS: SIGNIFICANT CHANGE UP
SPECIMEN SOURCE: SIGNIFICANT CHANGE UP

## 2024-10-10 NOTE — ED PROVIDER NOTE - NSICDXNOPASTMEDICALHX_GEN_ALL_ED

## 2025-05-09 ENCOUNTER — INPATIENT (INPATIENT)
Facility: HOSPITAL | Age: 35
LOS: 0 days | Discharge: HOME | End: 2025-05-10
Attending: OBSTETRICS & GYNECOLOGY | Admitting: OBSTETRICS & GYNECOLOGY

## 2025-05-09 ENCOUNTER — EMERGENCY (EMERGENCY)
Facility: HOSPITAL | Age: 35
LOS: 1 days | End: 2025-05-09
Attending: STUDENT IN AN ORGANIZED HEALTH CARE EDUCATION/TRAINING PROGRAM | Admitting: EMERGENCY MEDICINE
Payer: MEDICAID

## 2025-05-09 ENCOUNTER — APPOINTMENT (OUTPATIENT)
Dept: ANTEPARTUM | Facility: CLINIC | Age: 35
End: 2025-05-09

## 2025-05-09 VITALS
TEMPERATURE: 98 F | RESPIRATION RATE: 16 BRPM | OXYGEN SATURATION: 99 % | DIASTOLIC BLOOD PRESSURE: 84 MMHG | SYSTOLIC BLOOD PRESSURE: 124 MMHG | HEART RATE: 59 BPM

## 2025-05-09 VITALS
HEART RATE: 68 BPM | OXYGEN SATURATION: 98 % | DIASTOLIC BLOOD PRESSURE: 83 MMHG | SYSTOLIC BLOOD PRESSURE: 128 MMHG | TEMPERATURE: 98 F | RESPIRATION RATE: 16 BRPM

## 2025-05-09 VITALS
SYSTOLIC BLOOD PRESSURE: 132 MMHG | TEMPERATURE: 98 F | RESPIRATION RATE: 18 BRPM | HEART RATE: 65 BPM | WEIGHT: 175.93 LBS | DIASTOLIC BLOOD PRESSURE: 87 MMHG | OXYGEN SATURATION: 97 %

## 2025-05-09 DIAGNOSIS — O26.899 OTHER SPECIFIED PREGNANCY RELATED CONDITIONS, UNSPECIFIED TRIMESTER: ICD-10-CM

## 2025-05-09 LAB
BASOPHILS # BLD AUTO: 0.04 K/UL — SIGNIFICANT CHANGE UP (ref 0–0.2)
BASOPHILS NFR BLD AUTO: 0.5 % — SIGNIFICANT CHANGE UP (ref 0–2)
BLD GP AB SCN SERPL QL: NEGATIVE — SIGNIFICANT CHANGE UP
EOSINOPHIL # BLD AUTO: 0.14 K/UL — SIGNIFICANT CHANGE UP (ref 0–0.5)
EOSINOPHIL NFR BLD AUTO: 1.7 % — SIGNIFICANT CHANGE UP (ref 0–6)
HCT VFR BLD CALC: 35.2 % — SIGNIFICANT CHANGE UP (ref 34.5–45)
HGB BLD-MCNC: 11.8 G/DL — SIGNIFICANT CHANGE UP (ref 11.5–15.5)
IANC: 5.26 K/UL — SIGNIFICANT CHANGE UP (ref 1.8–7.4)
IMM GRANULOCYTES NFR BLD AUTO: 0.5 % — SIGNIFICANT CHANGE UP (ref 0–0.9)
LYMPHOCYTES # BLD AUTO: 2.13 K/UL — SIGNIFICANT CHANGE UP (ref 1–3.3)
LYMPHOCYTES # BLD AUTO: 26.3 % — SIGNIFICANT CHANGE UP (ref 13–44)
MCHC RBC-ENTMCNC: 28.4 PG — SIGNIFICANT CHANGE UP (ref 27–34)
MCHC RBC-ENTMCNC: 33.5 G/DL — SIGNIFICANT CHANGE UP (ref 32–36)
MCV RBC AUTO: 84.8 FL — SIGNIFICANT CHANGE UP (ref 80–100)
MONOCYTES # BLD AUTO: 0.48 K/UL — SIGNIFICANT CHANGE UP (ref 0–0.9)
MONOCYTES NFR BLD AUTO: 5.9 % — SIGNIFICANT CHANGE UP (ref 2–14)
NEUTROPHILS # BLD AUTO: 5.26 K/UL — SIGNIFICANT CHANGE UP (ref 1.8–7.4)
NEUTROPHILS NFR BLD AUTO: 65.1 % — SIGNIFICANT CHANGE UP (ref 43–77)
NRBC # BLD AUTO: 0 K/UL — SIGNIFICANT CHANGE UP (ref 0–0)
NRBC # FLD: 0 K/UL — SIGNIFICANT CHANGE UP (ref 0–0)
NRBC BLD AUTO-RTO: 0 /100 WBCS — SIGNIFICANT CHANGE UP (ref 0–0)
PLATELET # BLD AUTO: 243 K/UL — SIGNIFICANT CHANGE UP (ref 150–400)
RBC # BLD: 4.15 M/UL — SIGNIFICANT CHANGE UP (ref 3.8–5.2)
RBC # FLD: 14.3 % — SIGNIFICANT CHANGE UP (ref 10.3–14.5)
RH IG SCN BLD-IMP: POSITIVE — SIGNIFICANT CHANGE UP
WBC # BLD: 8.09 K/UL — SIGNIFICANT CHANGE UP (ref 3.8–10.5)
WBC # FLD AUTO: 8.09 K/UL — SIGNIFICANT CHANGE UP (ref 3.8–10.5)

## 2025-05-09 PROCEDURE — 99283 EMERGENCY DEPT VISIT LOW MDM: CPT

## 2025-05-09 PROCEDURE — 76815 OB US LIMITED FETUS(S): CPT | Mod: 26

## 2025-05-09 RX ORDER — OXYTOCIN-SODIUM CHLORIDE 0.9% IV SOLN 30 UNIT/500ML 30-0.9/5 UT/ML-%
SOLUTION INTRAVENOUS
Qty: 30 | Refills: 0 | Status: DISCONTINUED | OUTPATIENT
Start: 2025-05-09 | End: 2025-05-09

## 2025-05-09 RX ORDER — OXYTOCIN-SODIUM CHLORIDE 0.9% IV SOLN 30 UNIT/500ML 30-0.9/5 UT/ML-%
10 SOLUTION INTRAVENOUS ONCE
Refills: 0 | Status: DISCONTINUED | OUTPATIENT
Start: 2025-05-09 | End: 2025-05-09

## 2025-05-09 RX ORDER — SODIUM CHLORIDE 9 G/1000ML
1000 INJECTION, SOLUTION INTRAVENOUS ONCE
Refills: 0 | Status: DISCONTINUED | OUTPATIENT
Start: 2025-05-09 | End: 2025-05-09

## 2025-05-09 RX ORDER — SODIUM CHLORIDE 9 G/1000ML
1000 INJECTION, SOLUTION INTRAVENOUS
Refills: 0 | Status: DISCONTINUED | OUTPATIENT
Start: 2025-05-09 | End: 2025-05-09

## 2025-05-09 RX ORDER — DIBUCAINE 10 MG/G
1 OINTMENT TOPICAL EVERY 6 HOURS
Refills: 0 | Status: DISCONTINUED | OUTPATIENT
Start: 2025-05-09 | End: 2025-05-10

## 2025-05-09 RX ORDER — KETOROLAC TROMETHAMINE 30 MG/ML
30 INJECTION, SOLUTION INTRAMUSCULAR; INTRAVENOUS ONCE
Refills: 0 | Status: DISCONTINUED | OUTPATIENT
Start: 2025-05-09 | End: 2025-05-09

## 2025-05-09 RX ORDER — HYDROCORTISONE 10 MG/G
1 CREAM TOPICAL EVERY 6 HOURS
Refills: 0 | Status: DISCONTINUED | OUTPATIENT
Start: 2025-05-09 | End: 2025-05-10

## 2025-05-09 RX ORDER — IBUPROFEN 200 MG
600 TABLET ORAL EVERY 6 HOURS
Refills: 0 | Status: COMPLETED | OUTPATIENT
Start: 2025-05-09 | End: 2026-04-07

## 2025-05-09 RX ORDER — IBUPROFEN 200 MG
600 TABLET ORAL EVERY 6 HOURS
Refills: 0 | Status: DISCONTINUED | OUTPATIENT
Start: 2025-05-09 | End: 2025-05-10

## 2025-05-09 RX ORDER — ACETAMINOPHEN 500 MG/5ML
975 LIQUID (ML) ORAL
Refills: 0 | Status: DISCONTINUED | OUTPATIENT
Start: 2025-05-09 | End: 2025-05-10

## 2025-05-09 RX ORDER — PRAMOXINE HCL 1 %
1 GEL (GRAM) TOPICAL EVERY 4 HOURS
Refills: 0 | Status: DISCONTINUED | OUTPATIENT
Start: 2025-05-09 | End: 2025-05-10

## 2025-05-09 RX ORDER — MAGNESIUM HYDROXIDE 400 MG/5ML
30 SUSPENSION ORAL
Refills: 0 | Status: DISCONTINUED | OUTPATIENT
Start: 2025-05-09 | End: 2025-05-10

## 2025-05-09 RX ORDER — BENZOCAINE 220 MG/G
1 SPRAY, METERED PERIODONTAL EVERY 6 HOURS
Refills: 0 | Status: DISCONTINUED | OUTPATIENT
Start: 2025-05-09 | End: 2025-05-10

## 2025-05-09 RX ORDER — OXYCODONE HYDROCHLORIDE 30 MG/1
5 TABLET ORAL
Refills: 0 | Status: DISCONTINUED | OUTPATIENT
Start: 2025-05-09 | End: 2025-05-10

## 2025-05-09 RX ORDER — PRENATAL 136/IRON/FOLIC ACID 27 MG-1 MG
1 TABLET ORAL DAILY
Refills: 0 | Status: DISCONTINUED | OUTPATIENT
Start: 2025-05-09 | End: 2025-05-10

## 2025-05-09 RX ORDER — OXYTOCIN-SODIUM CHLORIDE 0.9% IV SOLN 30 UNIT/500ML 30-0.9/5 UT/ML-%
167 SOLUTION INTRAVENOUS
Qty: 30 | Refills: 0 | Status: DISCONTINUED | OUTPATIENT
Start: 2025-05-09 | End: 2025-05-10

## 2025-05-09 RX ORDER — SIMETHICONE 80 MG
80 TABLET,CHEWABLE ORAL EVERY 4 HOURS
Refills: 0 | Status: DISCONTINUED | OUTPATIENT
Start: 2025-05-09 | End: 2025-05-10

## 2025-05-09 RX ORDER — OXYTOCIN-SODIUM CHLORIDE 0.9% IV SOLN 30 UNIT/500ML 30-0.9/5 UT/ML-%
10 SOLUTION INTRAVENOUS ONCE
Refills: 0 | Status: COMPLETED | OUTPATIENT
Start: 2025-05-09 | End: 2025-05-09

## 2025-05-09 RX ORDER — OXYTOCIN-SODIUM CHLORIDE 0.9% IV SOLN 30 UNIT/500ML 30-0.9/5 UT/ML-%
167 SOLUTION INTRAVENOUS
Qty: 30 | Refills: 0 | Status: COMPLETED | OUTPATIENT
Start: 2025-05-09 | End: 2025-05-09

## 2025-05-09 RX ORDER — MODIFIED LANOLIN 100 %
1 CREAM (GRAM) TOPICAL EVERY 6 HOURS
Refills: 0 | Status: DISCONTINUED | OUTPATIENT
Start: 2025-05-09 | End: 2025-05-10

## 2025-05-09 RX ORDER — OXYCODONE HYDROCHLORIDE 30 MG/1
5 TABLET ORAL ONCE
Refills: 0 | Status: DISCONTINUED | OUTPATIENT
Start: 2025-05-09 | End: 2025-05-10

## 2025-05-09 RX ORDER — WITCH HAZEL LEAF
1 FLUID EXTRACT MISCELLANEOUS EVERY 4 HOURS
Refills: 0 | Status: DISCONTINUED | OUTPATIENT
Start: 2025-05-09 | End: 2025-05-10

## 2025-05-09 RX ORDER — DIPHENHYDRAMINE HCL 12.5MG/5ML
25 ELIXIR ORAL EVERY 6 HOURS
Refills: 0 | Status: DISCONTINUED | OUTPATIENT
Start: 2025-05-09 | End: 2025-05-10

## 2025-05-09 RX ADMIN — KETOROLAC TROMETHAMINE 30 MILLIGRAM(S): 30 INJECTION, SOLUTION INTRAMUSCULAR; INTRAVENOUS at 18:39

## 2025-05-09 RX ADMIN — Medication 975 MILLIGRAM(S): at 19:59

## 2025-05-09 RX ADMIN — Medication 3 MILLILITER(S): at 23:40

## 2025-05-09 RX ADMIN — SODIUM CHLORIDE 125 MILLILITER(S): 9 INJECTION, SOLUTION INTRAVENOUS at 11:33

## 2025-05-09 RX ADMIN — OXYTOCIN-SODIUM CHLORIDE 0.9% IV SOLN 30 UNIT/500ML 2 MILLIUNIT(S)/MIN: 30-0.9/5 SOLUTION at 11:34

## 2025-05-09 RX ADMIN — Medication 975 MILLIGRAM(S): at 19:35

## 2025-05-09 RX ADMIN — OXYTOCIN-SODIUM CHLORIDE 0.9% IV SOLN 30 UNIT/500ML 10 UNIT(S): 30-0.9/5 SOLUTION at 16:39

## 2025-05-09 RX ADMIN — OXYTOCIN-SODIUM CHLORIDE 0.9% IV SOLN 30 UNIT/500ML 167 MILLIUNIT(S)/MIN: 30-0.9/5 SOLUTION at 17:53

## 2025-05-09 RX ADMIN — Medication 600 MILLIGRAM(S): at 23:51

## 2025-05-09 NOTE — ED ADULT TRIAGE NOTE - CHIEF COMPLAINT QUOTE
Patient c/o abdominal pain x 1 week and vaginal bleeding starting at 0300. Reports nausea and vomiting this morning. Patient is pregnant, HAL . . . Used 2 pads today. Felt baby move earlier today. Reports contractions 5 mins apart, feels pelvic pressure and need to push. Patient appears mildly uncomfortable in triage. Denies phx. MD Bowser evaluating patient. Patient c/o abdominal pain x 1 week and vaginal bleeding starting at 0300. Reports nausea and vomiting this morning. Patient is pregnant, HAL . . . Used 2 pads today. Felt baby move earlier today. Reports contractions 5 mins apart, feels pelvic pressure and need to push. Patient appears mildly uncomfortable in triage. Denies phx. MD Bowser evaluating patient.   0910: After labor check, patient moved to [facility] for imminent delivery. L&D triage notified and code OB/ code 100 called per L&D triage provider.

## 2025-05-09 NOTE — ED PROVIDER NOTE - CARE PLAN
Principal Discharge DX:	Vaginal bleeding with contractions and pain in pregnant patient after first trimester   1

## 2025-05-09 NOTE — ED PROVIDER NOTE - CLINICAL SUMMARY MEDICAL DECISION MAKING FREE TEXT BOX
DO Jeremy, EM Attendin yo  HAL  Telugu speaking (ID: [id number]) at 39 weeks gestation presenting w/ contractions, loss of mucous plug, 1 episode of vomiting prior to arrival. PT had vaginal bleeding starting at 3 AM. code OB called, pt assessed, safe to transport to labor and delivery.

## 2025-05-09 NOTE — ED PROVIDER NOTE - OBJECTIVE STATEMENT
DO Jeremy, EM Attendin yo  HAL  Vietnamese speaking (ID: [id number]) at 39 weeks gestation presenting w/ contractions, loss of mucous plug, 1 episode of vomiting prior to arrival. PT had vaginal bleeding starting at 3 AM.

## 2025-05-09 NOTE — ED PROVIDER NOTE - ATTENDING CONTRIBUTION TO CARE
Jeremy PRUITT: Please see the HPI, ROS, PE and MDM as authored by me. I personally made the management plan, and take responsibility for the patient's management.

## 2025-05-09 NOTE — CHART NOTE - NSCHARTNOTEFT_GEN_A_CORE
At bedside with Dr. Lema OB  and [facility] L&D ANM due to code OB called overhead.   Per ED, patient with contractions, term pregnancy. ED triage stated they felt fetal head on vaginal exam prompting the Code OB.   PNC with [facility].   VE: very posterior, approx 2cm. No fluid appreciated on VE.   Bedside US: , +fetal movement, MVP 5.6cm.   Will bring pt to L&D triage for rule out labor evaluation.     Keri France, PGY4  Dr. Lema at bedside

## 2025-05-09 NOTE — ED ADULT NURSE NOTE - CHIEF COMPLAINT QUOTE
Patient c/o abdominal pain x 1 week and vaginal bleeding starting at 0300. Reports nausea and vomiting this morning. Patient is pregnant, HAL . . . Used 2 pads today. Felt baby move earlier today. Reports contractions 5 mins apart, feels pelvic pressure and need to push. Patient appears mildly uncomfortable in triage. Denies phx. MD Bowser evaluating patient.   0910: After labor check[facility] L&D triage notified and code OB/ code 100 called per L&D triage provider.

## 2025-05-09 NOTE — ED PROVIDER NOTE - PHYSICAL EXAMINATION
DO Jeremy, EM Attending: General: Well-appearing, NAD  Head: Atraumatic, normocephalic  Eyes: EOM grossly in tact, no scleral icterus  ENT: moist mucous membranes  Neurology: A&Ox3  Respiratory: normal respiratory effort  Abdominal: Soft, non-distended, gravid uterus, no  or active bleeding on external exam  2 cm dilated  Extremities: No edema  Skin: No rashes

## 2025-05-09 NOTE — ED ADULT NURSE NOTE - OBJECTIVE STATEMENT
received to TRA, HAL , , reports having contractions starting last night, now q5min. endorses excretion of blood and mucus,   code OB team,  code 100 team at bedside. exam completed, pt 2cm dilated. pt transported upstairs with OB team. VSS, appears comfortable, no acute respiratory distress.

## 2025-05-10 VITALS
SYSTOLIC BLOOD PRESSURE: 117 MMHG | RESPIRATION RATE: 16 BRPM | TEMPERATURE: 98 F | HEART RATE: 74 BPM | OXYGEN SATURATION: 100 % | DIASTOLIC BLOOD PRESSURE: 64 MMHG

## 2025-05-10 LAB
HCT VFR BLD CALC: 33.9 % — LOW (ref 34.5–45)
HGB BLD-MCNC: 11.4 G/DL — LOW (ref 11.5–15.5)
MCHC RBC-ENTMCNC: 28.6 PG — SIGNIFICANT CHANGE UP (ref 27–34)
MCHC RBC-ENTMCNC: 33.6 G/DL — SIGNIFICANT CHANGE UP (ref 32–36)
MCV RBC AUTO: 85.2 FL — SIGNIFICANT CHANGE UP (ref 80–100)
NRBC # BLD AUTO: 0 K/UL — SIGNIFICANT CHANGE UP (ref 0–0)
NRBC # FLD: 0 K/UL — SIGNIFICANT CHANGE UP (ref 0–0)
NRBC BLD AUTO-RTO: 0 /100 WBCS — SIGNIFICANT CHANGE UP (ref 0–0)
PLATELET # BLD AUTO: 211 K/UL — SIGNIFICANT CHANGE UP (ref 150–400)
RBC # BLD: 3.98 M/UL — SIGNIFICANT CHANGE UP (ref 3.8–5.2)
RBC # FLD: 14.5 % — SIGNIFICANT CHANGE UP (ref 10.3–14.5)
RUBV IGG SER-ACNC: 3.52 INDEX — SIGNIFICANT CHANGE UP
RUBV IGG SER-IMP: POSITIVE — SIGNIFICANT CHANGE UP
T PALLIDUM AB TITR SER: NEGATIVE — SIGNIFICANT CHANGE UP
WBC # BLD: 9.67 K/UL — SIGNIFICANT CHANGE UP (ref 3.8–10.5)
WBC # FLD AUTO: 9.67 K/UL — SIGNIFICANT CHANGE UP (ref 3.8–10.5)

## 2025-05-10 RX ORDER — WITCH HAZEL LEAF
1 FLUID EXTRACT MISCELLANEOUS
Qty: 0 | Refills: 0 | DISCHARGE
Start: 2025-05-10

## 2025-05-10 RX ORDER — IBUPROFEN 200 MG
1 TABLET ORAL
Qty: 0 | Refills: 0 | DISCHARGE
Start: 2025-05-10

## 2025-05-10 RX ORDER — MODIFIED LANOLIN 100 %
1 CREAM (GRAM) TOPICAL
Qty: 0 | Refills: 0 | DISCHARGE
Start: 2025-05-10

## 2025-05-10 RX ORDER — ACETAMINOPHEN 500 MG/5ML
3 LIQUID (ML) ORAL
Qty: 0 | Refills: 0 | DISCHARGE
Start: 2025-05-10

## 2025-05-10 RX ORDER — FERROUS SULFATE 137(45) MG
TABLET, EXTENDED RELEASE ORAL
Refills: 0 | DISCHARGE

## 2025-05-10 RX ADMIN — Medication 600 MILLIGRAM(S): at 13:20

## 2025-05-10 RX ADMIN — Medication 600 MILLIGRAM(S): at 05:52

## 2025-05-10 RX ADMIN — Medication 600 MILLIGRAM(S): at 00:26

## 2025-05-10 RX ADMIN — Medication 3 MILLILITER(S): at 06:22

## 2025-05-10 RX ADMIN — Medication 600 MILLIGRAM(S): at 06:22

## 2025-05-10 RX ADMIN — Medication 975 MILLIGRAM(S): at 14:36

## 2025-05-10 RX ADMIN — Medication 975 MILLIGRAM(S): at 09:20

## 2025-05-10 RX ADMIN — Medication 975 MILLIGRAM(S): at 08:20

## 2025-05-10 RX ADMIN — Medication 600 MILLIGRAM(S): at 17:48

## 2025-05-10 RX ADMIN — Medication 600 MILLIGRAM(S): at 12:21
